# Patient Record
Sex: FEMALE | Race: WHITE
[De-identification: names, ages, dates, MRNs, and addresses within clinical notes are randomized per-mention and may not be internally consistent; named-entity substitution may affect disease eponyms.]

---

## 2018-02-12 ENCOUNTER — HOSPITAL ENCOUNTER (OUTPATIENT)
Dept: HOSPITAL 76 - DI | Age: 68
Discharge: HOME | End: 2018-02-12
Attending: PHYSICIAN ASSISTANT
Payer: MEDICARE

## 2018-02-12 DIAGNOSIS — F17.210: ICD-10-CM

## 2018-02-12 DIAGNOSIS — Z12.2: Primary | ICD-10-CM

## 2018-02-12 NOTE — CT REPORT
CT OF CHEST WITHOUT CONTRAST FOR LUNG CANCER SCREENIN2018

 

CLINICAL INDICATION:  A 67-year-old current smoker with a 50-pack-year history 
of smoking, for 

lung cancer screening.

 

COMPARISON:  No previous CT is available for comparison.

 

TECHNIQUE:  Axial noncontrast images of the chest, using low-dose screening 
technique.

 

In accordance with CT protocol optimization, one or more of the following dose 
reduction 

techniques were utilized for this exam:  automated exposure control, adjustment 
of mA and/or KV

based on patient size, or use of iterative reconstructive technique.

 

FINDINGS:  The heart and great vessels demonstrate minimal atherosclerotic 
calcification.  

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 
subclavian artery, but 

no other vascular anomaly is identified.  No hilar or mediastinal 
lymphadenopathy is present.  

The lungs demonstrate minimal emphysema.  No pulmonary nodule or mass lesion is 
identified.  No

effusion or pneumothorax is present.  Limited evaluation of the upper abdominal 
structures 

demonstrates normal adrenal glands and normal situs.  Osseous structures 
demonstrate mild 

degenerative changes.

 

IMPRESSION: NEGATIVE EXAMINATION.

 

RECOMMENDATION:  CONTINUE ANNUAL SCREENING WITH LOW DOSE CHEST CT IN TWELVE 
MONTHS.

 

LUNG RADS CATEGORY 1 - NEGATIVE.

 

 

 

 

 

DD: 2018 11:47

TD: 2018 12:23

Job #: [id number]

MTDD

## 2019-12-05 ENCOUNTER — HOSPITAL ENCOUNTER (OUTPATIENT)
Age: 69
End: 2019-12-05
Payer: MEDICARE

## 2019-12-05 DIAGNOSIS — K22.10: ICD-10-CM

## 2019-12-05 DIAGNOSIS — R00.2: ICD-10-CM

## 2019-12-05 DIAGNOSIS — K21.9: Primary | ICD-10-CM

## 2019-12-05 DIAGNOSIS — E78.5: ICD-10-CM

## 2019-12-05 LAB
ADD MANUAL DIFF / SLIDE REVIEW: NO
ALBUMIN SERPL-MCNC: 4.2 G/DL (ref 3.5–5)
ALBUMIN/GLOB SERPL: 1.4 {RATIO} (ref 1–2.8)
ALP SERPL-CCNC: 79 U/L (ref 38–126)
ALT SERPL-CCNC: 17 IU/L (ref ?–35)
BUN SERPL-MCNC: 18 MG/DL (ref 7–17)
CALCIUM SERPL-MCNC: 10 MG/DL (ref 8.4–10.2)
CHLORIDE SERPL-SCNC: 105 MMOL/L (ref 98–107)
CHOLEST SERPL-MCNC: 244 MG/DL (ref 140–199)
CO2 SERPL-SCNC: 29 MMOL/L (ref 22–32)
ESTIMATED GLOMERULAR FILT RATE: > 60 ML/MIN (ref 60–?)
GLOBULIN SER CALC-MCNC: 2.9 G/DL (ref 1.7–4.1)
GLUCOSE SERPL-MCNC: 94 MG/DL (ref 80–110)
HDLC SERPL-MCNC: 64 MG/DL (ref 40–60)
HEMATOCRIT: 44.7 % (ref 36–46)
HEMOGLOBIN: 14.9 G/DL (ref 12–16)
HEMOLYSIS: < 15 (ref 0–50)
LYMPHOCYTES # SPEC AUTO: 2500 /UL (ref 1100–4500)
MCV RBC: 88.1 FL (ref 80–100)
MEAN CORPUSCULAR HEMOGLOBIN: 29.4 PG (ref 26–34)
MEAN CORPUSCULAR HGB CONC: 33.4 % (ref 30–36)
PLATELET COUNT: 314 X10^3/UL (ref 150–400)
POTASSIUM SERPL-SCNC: 5.1 MMOL/L (ref 3.4–5.1)
PROT SERPL-MCNC: 7.1 G/DL (ref 6.3–8.2)
SODIUM SERPL-SCNC: 141 MMOL/L (ref 137–145)
T3FREE SERPL-MCNC: 3.77 PG/ML (ref 2.77–5.27)
T4 FREE SERPL-MCNC: 0.99 NG/DL (ref 0.78–2.19)
TRIGL SERPL-MCNC: 55 MG/DL (ref 35–150)
TSH SERPL DL<=0.05 MIU/L-ACNC: 1.5 UIU/ML (ref 0.47–4.68)

## 2019-12-05 PROCEDURE — 80053 COMPREHEN METABOLIC PANEL: CPT

## 2019-12-05 PROCEDURE — 84439 ASSAY OF FREE THYROXINE: CPT

## 2019-12-05 PROCEDURE — 84443 ASSAY THYROID STIM HORMONE: CPT

## 2019-12-05 PROCEDURE — 85025 COMPLETE CBC W/AUTO DIFF WBC: CPT

## 2019-12-05 PROCEDURE — 83013 H PYLORI (C-13) BREATH: CPT

## 2019-12-05 PROCEDURE — 80061 LIPID PANEL: CPT

## 2019-12-05 PROCEDURE — 84481 FREE ASSAY (FT-3): CPT

## 2019-12-07 LAB — UREA BREATH TEST >18YRS: NOT DETECTED

## 2019-12-27 ENCOUNTER — HOSPITAL ENCOUNTER (OUTPATIENT)
Age: 69
End: 2019-12-27
Payer: MEDICARE

## 2019-12-27 DIAGNOSIS — Z12.31: ICD-10-CM

## 2019-12-27 DIAGNOSIS — I65.23: Primary | ICD-10-CM

## 2019-12-27 DIAGNOSIS — Z78.0: ICD-10-CM

## 2019-12-27 DIAGNOSIS — Z13.820: ICD-10-CM

## 2019-12-27 DIAGNOSIS — F17.200: ICD-10-CM

## 2019-12-27 DIAGNOSIS — M85.852: ICD-10-CM

## 2019-12-27 PROCEDURE — 77063 BREAST TOMOSYNTHESIS BI: CPT

## 2019-12-27 PROCEDURE — 93880 EXTRACRANIAL BILAT STUDY: CPT

## 2019-12-27 PROCEDURE — 77080 DXA BONE DENSITY AXIAL: CPT

## 2019-12-27 PROCEDURE — 77067 SCR MAMMO BI INCL CAD: CPT

## 2022-07-07 ENCOUNTER — HOSPITAL ENCOUNTER (OUTPATIENT)
Age: 72
End: 2022-07-07
Payer: MEDICARE

## 2022-07-07 DIAGNOSIS — E78.5: Primary | ICD-10-CM

## 2022-07-07 DIAGNOSIS — Z79.899: ICD-10-CM

## 2022-07-07 DIAGNOSIS — R00.2: ICD-10-CM

## 2022-07-07 LAB
ALBUMIN SERPL-MCNC: 4.2 G/DL (ref 3.5–5)
ALBUMIN/GLOB SERPL: 1.3 {RATIO} (ref 1–2.8)
ALP SERPL-CCNC: 66 U/L (ref 38–126)
ALT SERPL-CCNC: 14 IU/L (ref ?–35)
BUN SERPL-MCNC: 16 MG/DL (ref 7–17)
CALCIUM SERPL-MCNC: 9.5 MG/DL (ref 8.4–10.2)
CHLORIDE SERPL-SCNC: 106 MMOL/L (ref 98–107)
CHOLEST SERPL-MCNC: 232 MG/DL (ref 140–199)
CO2 SERPL-SCNC: 27 MMOL/L (ref 22–32)
ESTIMATED GLOMERULAR FILT RATE: > 60 ML/MIN (ref 60–?)
GLOBULIN SER CALC-MCNC: 3.3 G/DL (ref 1.7–4.1)
GLUCOSE SERPL-MCNC: 94 MG/DL (ref 80–110)
HCV AB SERPL QL IA: NEGATIVE S/C
HDLC SERPL-MCNC: 57 MG/DL (ref 40–60)
HEMOLYSIS: < 15 (ref 0–50)
POTASSIUM SERPL-SCNC: 4.5 MMOL/L (ref 3.4–5.1)
PROT SERPL-MCNC: 7.5 G/DL (ref 6.3–8.2)
SODIUM SERPL-SCNC: 143 MMOL/L (ref 137–145)
TRIGL SERPL-MCNC: 98 MG/DL (ref 35–150)

## 2022-07-07 PROCEDURE — 84443 ASSAY THYROID STIM HORMONE: CPT

## 2022-07-07 PROCEDURE — 86803 HEPATITIS C AB TEST: CPT

## 2022-07-07 PROCEDURE — 84481 FREE ASSAY (FT-3): CPT

## 2022-07-07 PROCEDURE — 80053 COMPREHEN METABOLIC PANEL: CPT

## 2022-07-07 PROCEDURE — 36415 COLL VENOUS BLD VENIPUNCTURE: CPT

## 2022-07-07 PROCEDURE — 80061 LIPID PANEL: CPT

## 2022-07-07 PROCEDURE — 84439 ASSAY OF FREE THYROXINE: CPT

## 2022-07-08 LAB
T3FREE SERPL-MCNC: 3.83 PG/ML (ref 2.77–5.27)
T4 FREE SERPL-MCNC: 1.18 NG/DL (ref 0.78–2.19)
TSH SERPL DL<=0.05 MIU/L-ACNC: 1.3 UIU/ML (ref 0.47–4.68)

## 2023-08-31 ENCOUNTER — HOSPITAL ENCOUNTER (EMERGENCY)
Age: 73
Discharge: HOME | End: 2023-08-31
Payer: MEDICARE

## 2023-08-31 VITALS
SYSTOLIC BLOOD PRESSURE: 115 MMHG | RESPIRATION RATE: 18 BRPM | TEMPERATURE: 98 F | DIASTOLIC BLOOD PRESSURE: 65 MMHG | HEART RATE: 62 BPM | OXYGEN SATURATION: 97 %

## 2023-08-31 VITALS
SYSTOLIC BLOOD PRESSURE: 94 MMHG | HEART RATE: 56 BPM | RESPIRATION RATE: 14 BRPM | DIASTOLIC BLOOD PRESSURE: 51 MMHG | OXYGEN SATURATION: 96 %

## 2023-08-31 VITALS — OXYGEN SATURATION: 98 % | HEART RATE: 61 BPM

## 2023-08-31 VITALS
RESPIRATION RATE: 14 BRPM | DIASTOLIC BLOOD PRESSURE: 53 MMHG | HEART RATE: 52 BPM | OXYGEN SATURATION: 97 % | SYSTOLIC BLOOD PRESSURE: 114 MMHG

## 2023-08-31 VITALS — HEART RATE: 52 BPM | OXYGEN SATURATION: 98 %

## 2023-08-31 VITALS — DIASTOLIC BLOOD PRESSURE: 59 MMHG | HEART RATE: 73 BPM | OXYGEN SATURATION: 95 % | SYSTOLIC BLOOD PRESSURE: 107 MMHG

## 2023-08-31 VITALS
DIASTOLIC BLOOD PRESSURE: 68 MMHG | OXYGEN SATURATION: 99 % | SYSTOLIC BLOOD PRESSURE: 158 MMHG | HEART RATE: 60 BPM | RESPIRATION RATE: 14 BRPM

## 2023-08-31 VITALS
DIASTOLIC BLOOD PRESSURE: 54 MMHG | HEART RATE: 71 BPM | OXYGEN SATURATION: 98 % | RESPIRATION RATE: 16 BRPM | SYSTOLIC BLOOD PRESSURE: 111 MMHG

## 2023-08-31 VITALS — BODY MASS INDEX: 22.3 KG/M2

## 2023-08-31 VITALS — SYSTOLIC BLOOD PRESSURE: 117 MMHG | DIASTOLIC BLOOD PRESSURE: 57 MMHG | OXYGEN SATURATION: 95 % | HEART RATE: 55 BPM

## 2023-08-31 VITALS — OXYGEN SATURATION: 97 % | HEART RATE: 52 BPM

## 2023-08-31 VITALS — HEART RATE: 60 BPM | OXYGEN SATURATION: 97 %

## 2023-08-31 DIAGNOSIS — R10.31: Primary | ICD-10-CM

## 2023-08-31 LAB
ADD MANUAL DIFF / SLIDE REVIEW: (no result)
ALBUMIN SERPL-MCNC: 3.6 G/DL (ref 3.5–5)
ALBUMIN/GLOB SERPL: 1.2 {RATIO} (ref 1–2.8)
ALP SERPL-CCNC: 60 U/L (ref 38–126)
ALT SERPL-CCNC: 17 IU/L (ref ?–35)
BUN SERPL-MCNC: 22 MG/DL (ref 7–17)
CALCIUM SERPL-MCNC: 8.6 MG/DL (ref 8.4–10.2)
CHLORIDE SERPL-SCNC: 108 MMOL/L (ref 98–107)
CO2 SERPL-SCNC: 28 MMOL/L (ref 22–32)
ESTIMATED GLOMERULAR FILT RATE: > 60 ML/MIN (ref 60–?)
GLOBULIN SER CALC-MCNC: 2.9 G/DL (ref 1.7–4.1)
GLUCOSE SERPL-MCNC: 112 MG/DL (ref 80–110)
HEMATOCRIT: 44.3 % (ref 36–46)
HEMOGLOBIN: 15.1 G/DL (ref 12–16)
HEMOLYSIS: < 15 (ref 0–50)
LIPASE SERPL-CCNC: 102 U/L (ref 23–300)
LYMPHOCYTES # SPEC AUTO: 4000 /UL (ref 1100–4500)
MCV RBC: 86.4 FL (ref 80–100)
MEAN CORPUSCULAR HEMOGLOBIN: 29.4 PG (ref 26–34)
MEAN CORPUSCULAR HGB CONC: 34 % (ref 30–36)
PLATELET COUNT: (no result) X10^3/UL (ref 150–400)
POTASSIUM SERPL-SCNC: 4 MMOL/L (ref 3.4–5.1)
PROT SERPL-MCNC: 6.5 G/DL (ref 6.3–8.2)
SODIUM SERPL-SCNC: 140 MMOL/L (ref 137–145)

## 2023-08-31 PROCEDURE — 96361 HYDRATE IV INFUSION ADD-ON: CPT

## 2023-08-31 PROCEDURE — 81015 MICROSCOPIC EXAM OF URINE: CPT

## 2023-08-31 PROCEDURE — 74177 CT ABD & PELVIS W/CONTRAST: CPT

## 2023-08-31 PROCEDURE — 81003 URINALYSIS AUTO W/O SCOPE: CPT

## 2023-08-31 PROCEDURE — 99284 EMERGENCY DEPT VISIT MOD MDM: CPT

## 2023-08-31 PROCEDURE — 36415 COLL VENOUS BLD VENIPUNCTURE: CPT

## 2023-08-31 PROCEDURE — 83690 ASSAY OF LIPASE: CPT

## 2023-08-31 PROCEDURE — 85025 COMPLETE CBC W/AUTO DIFF WBC: CPT

## 2023-08-31 PROCEDURE — 80053 COMPREHEN METABOLIC PANEL: CPT

## 2023-08-31 PROCEDURE — 96374 THER/PROPH/DIAG INJ IV PUSH: CPT

## 2024-07-24 ENCOUNTER — HOSPITAL ENCOUNTER (OUTPATIENT)
Dept: HOSPITAL 73 - LAB | Age: 74
End: 2024-07-24
Payer: MEDICARE

## 2024-07-24 DIAGNOSIS — Z01.419: Primary | ICD-10-CM

## 2024-07-24 DIAGNOSIS — M70.922: ICD-10-CM

## 2024-07-24 DIAGNOSIS — M79.602: ICD-10-CM

## 2024-07-24 LAB
ADD MANUAL DIFF / SLIDE REVIEW: NO
ALBUMIN SERPL-MCNC: 4.2 G/DL (ref 3.5–5)
ALBUMIN/GLOB SERPL: 1.5 {RATIO} (ref 1–2.8)
ALP SERPL-CCNC: 78 U/L (ref 38–126)
ALT SERPL-CCNC: 18 IU/L (ref ?–35)
BUN SERPL-MCNC: 23 MG/DL (ref 7–17)
CALCIUM SERPL-MCNC: 9.6 MG/DL (ref 8.4–10.2)
CHLORIDE SERPL-SCNC: 108 MMOL/L (ref 98–107)
CO2 SERPL-SCNC: 26 MMOL/L (ref 22–32)
ESTIMATED GLOMERULAR FILT RATE: > 60 ML/MIN (ref 60–?)
GLOBULIN SER CALC-MCNC: 2.8 G/DL (ref 1.7–4.1)
GLUCOSE SERPL-MCNC: 94 MG/DL (ref 80–110)
HEMATOCRIT: 44.7 % (ref 36–46)
HEMOGLOBIN: 15 G/DL (ref 12–16)
HEMOLYSIS: < 15 (ref 0–50)
LYMPHOCYTES # SPEC AUTO: 3000 /UL (ref 1100–4500)
MCV RBC: 87.7 FL (ref 80–100)
MEAN CORPUSCULAR HEMOGLOBIN: 29.4 PG (ref 26–34)
MEAN CORPUSCULAR HGB CONC: 33.5 % (ref 30–36)
PLATELET COUNT: 352 X10^3/UL (ref 150–400)
POTASSIUM SERPL-SCNC: 6 MMOL/L (ref 3.4–5.1)
PROT SERPL-MCNC: 7 G/DL (ref 6.3–8.2)
SODIUM SERPL-SCNC: 140 MMOL/L (ref 137–145)
T3FREE SERPL-MCNC: 3.58 PG/ML (ref 2.77–5.27)
T4 FREE SERPL-MCNC: 1.06 NG/DL (ref 0.78–2.19)
TSH SERPL DL<=0.05 MIU/L-ACNC: 1.65 UIU/ML (ref 0.47–4.68)

## 2024-07-24 PROCEDURE — 93005 ELECTROCARDIOGRAM TRACING: CPT

## 2024-07-24 PROCEDURE — 80053 COMPREHEN METABOLIC PANEL: CPT

## 2024-07-24 PROCEDURE — 84443 ASSAY THYROID STIM HORMONE: CPT

## 2024-07-24 PROCEDURE — 84481 FREE ASSAY (FT-3): CPT

## 2024-07-24 PROCEDURE — 36415 COLL VENOUS BLD VENIPUNCTURE: CPT

## 2024-07-24 PROCEDURE — 85025 COMPLETE CBC W/AUTO DIFF WBC: CPT

## 2024-07-24 PROCEDURE — 84439 ASSAY OF FREE THYROXINE: CPT

## 2024-07-29 ENCOUNTER — HOSPITAL ENCOUNTER (OUTPATIENT)
Age: 74
End: 2024-07-29
Payer: MEDICARE

## 2024-07-29 DIAGNOSIS — E87.5: Primary | ICD-10-CM

## 2024-07-29 LAB
HEMOLYSIS: < 15 (ref 0–50)
POTASSIUM SERPL-SCNC: 4.5 MMOL/L (ref 3.4–5.1)

## 2024-07-29 PROCEDURE — 84132 ASSAY OF SERUM POTASSIUM: CPT

## 2024-08-01 ENCOUNTER — HOSPITAL ENCOUNTER (OUTPATIENT)
Age: 74
End: 2024-08-01
Payer: MEDICARE

## 2024-08-01 DIAGNOSIS — I65.23: Primary | ICD-10-CM

## 2024-08-01 DIAGNOSIS — I25.10: ICD-10-CM

## 2024-08-01 DIAGNOSIS — Z12.2: ICD-10-CM

## 2024-08-01 DIAGNOSIS — M81.0: ICD-10-CM

## 2024-08-01 DIAGNOSIS — F17.210: ICD-10-CM

## 2024-08-01 DIAGNOSIS — R20.2: ICD-10-CM

## 2024-08-01 DIAGNOSIS — R20.0: ICD-10-CM

## 2024-08-01 PROCEDURE — 77080 DXA BONE DENSITY AXIAL: CPT

## 2024-08-01 PROCEDURE — 93880 EXTRACRANIAL BILAT STUDY: CPT

## 2024-08-01 PROCEDURE — 71271 CT THORAX LUNG CANCER SCR C-: CPT

## 2024-10-01 ENCOUNTER — HOSPITAL ENCOUNTER (OUTPATIENT)
Age: 74
Discharge: HOME | End: 2024-10-01
Payer: MEDICARE

## 2024-10-01 VITALS
RESPIRATION RATE: 19 BRPM | OXYGEN SATURATION: 99 % | HEART RATE: 61 BPM | SYSTOLIC BLOOD PRESSURE: 113 MMHG | TEMPERATURE: 97.16 F | DIASTOLIC BLOOD PRESSURE: 50 MMHG

## 2024-10-01 VITALS
OXYGEN SATURATION: 99 % | RESPIRATION RATE: 22 BRPM | HEART RATE: 78 BPM | DIASTOLIC BLOOD PRESSURE: 70 MMHG | TEMPERATURE: 97.2 F | SYSTOLIC BLOOD PRESSURE: 94 MMHG

## 2024-10-01 VITALS
RESPIRATION RATE: 20 BRPM | SYSTOLIC BLOOD PRESSURE: 90 MMHG | OXYGEN SATURATION: 98 % | HEART RATE: 70 BPM | DIASTOLIC BLOOD PRESSURE: 49 MMHG

## 2024-10-01 VITALS
OXYGEN SATURATION: 96 % | DIASTOLIC BLOOD PRESSURE: 48 MMHG | RESPIRATION RATE: 20 BRPM | HEART RATE: 71 BPM | TEMPERATURE: 97.1 F | SYSTOLIC BLOOD PRESSURE: 97 MMHG

## 2024-10-01 VITALS
SYSTOLIC BLOOD PRESSURE: 119 MMHG | TEMPERATURE: 98.1 F | RESPIRATION RATE: 16 BRPM | OXYGEN SATURATION: 97 % | DIASTOLIC BLOOD PRESSURE: 59 MMHG | HEART RATE: 58 BPM

## 2024-10-01 DIAGNOSIS — Z12.11: Primary | ICD-10-CM

## 2024-10-01 DIAGNOSIS — D12.3: ICD-10-CM

## 2024-10-01 DIAGNOSIS — K57.30: ICD-10-CM

## 2024-10-01 PROCEDURE — 45385 COLONOSCOPY W/LESION REMOVAL: CPT

## 2024-10-01 PROCEDURE — 0DJD8ZZ INSPECTION OF LOWER INTESTINAL TRACT, VIA NATURAL OR ARTIFICIAL OPENING ENDOSCOPIC: ICD-10-PCS | Performed by: SURGERY
